# Patient Record
Sex: MALE | Race: WHITE | NOT HISPANIC OR LATINO | Employment: OTHER | URBAN - METROPOLITAN AREA
[De-identification: names, ages, dates, MRNs, and addresses within clinical notes are randomized per-mention and may not be internally consistent; named-entity substitution may affect disease eponyms.]

---

## 2021-07-09 ENCOUNTER — TRANSFERRED RECORDS (OUTPATIENT)
Dept: HEALTH INFORMATION MANAGEMENT | Facility: CLINIC | Age: 76
End: 2021-07-09

## 2021-07-14 ENCOUNTER — TRANSCRIBE ORDERS (OUTPATIENT)
Dept: OTHER | Age: 76
End: 2021-07-14

## 2021-07-14 DIAGNOSIS — H46.9 OPTIC NEUROPATHY: Primary | ICD-10-CM

## 2021-08-24 ENCOUNTER — OFFICE VISIT (OUTPATIENT)
Dept: OPHTHALMOLOGY | Facility: CLINIC | Age: 76
End: 2021-08-24
Attending: OPTOMETRIST
Payer: MEDICARE

## 2021-08-24 DIAGNOSIS — H53.40 VISUAL FIELD DEFECT: ICD-10-CM

## 2021-08-24 DIAGNOSIS — H53.40 VISUAL FIELD DEFECT: Primary | ICD-10-CM

## 2021-08-24 DIAGNOSIS — H46.9 OPTIC NEUROPATHY: ICD-10-CM

## 2021-08-24 PROBLEM — R35.0 BENIGN PROSTATIC HYPERPLASIA WITH URINARY FREQUENCY: Status: ACTIVE | Noted: 2020-06-08

## 2021-08-24 PROBLEM — I42.9 CARDIOMYOPATHY (H): Status: ACTIVE | Noted: 2019-04-16

## 2021-08-24 PROBLEM — I47.19 ATRIAL TACHYCARDIA (H): Status: ACTIVE | Noted: 2019-04-16

## 2021-08-24 PROBLEM — F03.90 DEMENTIA (H): Status: ACTIVE | Noted: 2021-04-23

## 2021-08-24 PROBLEM — I20.0 UNSTABLE ANGINA (H): Status: ACTIVE | Noted: 2019-04-15

## 2021-08-24 PROBLEM — M54.16 LUMBAR RADICULOPATHY: Status: ACTIVE | Noted: 2021-01-06

## 2021-08-24 PROBLEM — R79.89 LOW VITAMIN B12 LEVEL: Status: ACTIVE | Noted: 2021-05-24

## 2021-08-24 PROBLEM — I50.22 CHRONIC SYSTOLIC HEART FAILURE (H): Status: ACTIVE | Noted: 2019-04-16

## 2021-08-24 PROBLEM — I34.0 MITRAL VALVE INSUFFICIENCY: Status: ACTIVE | Noted: 2020-06-08

## 2021-08-24 PROBLEM — N40.1 BENIGN PROSTATIC HYPERPLASIA WITH URINARY FREQUENCY: Status: ACTIVE | Noted: 2020-06-08

## 2021-08-24 PROBLEM — G20.C PARKINSONISM (H): Status: ACTIVE | Noted: 2021-04-23

## 2021-08-24 PROBLEM — I25.10 MILD CORONARY ARTERY DISEASE: Status: ACTIVE | Noted: 2019-02-12

## 2021-08-24 PROBLEM — I48.0 PAROXYSMAL ATRIAL FIBRILLATION (H): Status: ACTIVE | Noted: 2019-04-17

## 2021-08-24 PROCEDURE — 92004 COMPRE OPH EXAM NEW PT 1/>: CPT | Mod: GC | Performed by: OPHTHALMOLOGY

## 2021-08-24 PROCEDURE — G0463 HOSPITAL OUTPT CLINIC VISIT: HCPCS

## 2021-08-24 RX ORDER — ATORVASTATIN CALCIUM 40 MG/1
TABLET, FILM COATED ORAL
COMMUNITY
Start: 2020-11-02

## 2021-08-24 RX ORDER — DONEPEZIL HYDROCHLORIDE 5 MG/1
TABLET, FILM COATED ORAL
COMMUNITY
Start: 2021-04-23 | End: 2022-05-23

## 2021-08-24 RX ORDER — MENTHOL 40 MG/ML
GEL TOPICAL
COMMUNITY

## 2021-08-24 RX ORDER — LOPERAMIDE HCL 2 MG
CAPSULE ORAL
COMMUNITY
Start: 2021-06-14

## 2021-08-24 RX ORDER — METOPROLOL TARTRATE 25 MG/1
TABLET, FILM COATED ORAL
COMMUNITY
Start: 2020-06-15

## 2021-08-24 RX ORDER — TRAMADOL HYDROCHLORIDE 50 MG/1
50-100 TABLET ORAL
COMMUNITY
Start: 2020-06-15

## 2021-08-24 RX ORDER — FUROSEMIDE 20 MG
20 TABLET ORAL
COMMUNITY
Start: 2020-06-15 | End: 2022-07-02

## 2021-08-24 RX ORDER — LOSARTAN POTASSIUM 25 MG/1
TABLET ORAL
COMMUNITY
Start: 2020-06-15

## 2021-08-24 RX ORDER — CARBIDOPA AND LEVODOPA 25; 100 MG/1; MG/1
2 TABLET ORAL
COMMUNITY
Start: 2021-07-26

## 2021-08-24 RX ORDER — TAMSULOSIN HYDROCHLORIDE 0.4 MG/1
CAPSULE ORAL
COMMUNITY
Start: 2020-06-15

## 2021-08-24 RX ORDER — AMOXICILLIN 250 MG
CAPSULE ORAL
COMMUNITY
Start: 2021-03-22

## 2021-08-24 RX ORDER — LEVETIRACETAM 500 MG/1
500 TABLET ORAL
COMMUNITY
Start: 2021-08-02 | End: 2021-09-01

## 2021-08-24 RX ORDER — WARFARIN SODIUM 3 MG/1
TABLET ORAL
COMMUNITY
Start: 2021-05-05

## 2021-08-24 ASSESSMENT — CONF VISUAL FIELD
OS_INFERIOR_NASAL_RESTRICTION: 1
OS_SUPERIOR_TEMPORAL_RESTRICTION: 1
OD_SUPERIOR_NASAL_RESTRICTION: 1
OD_SUPERIOR_TEMPORAL_RESTRICTION: 1
OS_SUPERIOR_NASAL_RESTRICTION: 1
OD_INFERIOR_TEMPORAL_RESTRICTION: 1
OS_INFERIOR_TEMPORAL_RESTRICTION: 1
OD_INFERIOR_NASAL_RESTRICTION: 1

## 2021-08-24 ASSESSMENT — VISUAL ACUITY
OD_SC: 20/600
METHOD: SNELLEN - LINEAR
OS_SC: 20/300

## 2021-08-24 ASSESSMENT — CUP TO DISC RATIO
OD_RATIO: 0
OS_RATIO: 0

## 2021-08-24 ASSESSMENT — SLIT LAMP EXAM - LIDS
COMMENTS: BLEPHARITIS
COMMENTS: BLEPHARITIS

## 2021-08-24 ASSESSMENT — TONOMETRY
OD_IOP_MMHG: 13
OS_IOP_MMHG: 13
IOP_METHOD: ICARE

## 2021-08-24 NOTE — PROGRESS NOTES
Assessment & Plan     Brian Pepper is a 75 year old male with the following diagnoses:   1. Optic neuropathy    2. Visual field defect         Patient was sent for consultation by Dr. Harry Koch for optic neuropathy.      HPI:  The patient is presenting with an acute illness that potentially poses a threat to life or bodily function (vision).    Reports that he has had hazy vision in both eyes since earlier this year. Saw Dr. 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 Patient reports that he sometimes sees circles or zig zag lines in his vision. Possible history of migraines. He has light sensitivity currently and he never had this previously. Reports left temporal headaches that sometimes improve with Tylenol.     Multiple issues since January. Significant issues with balance, cannot sit in a chair without falling over. Sinemet started May or June for Parkinsonism. Doesn't think Sinemet has helped. Has fallen 4-5 times since January.   PCIOL in 2019 or 2020.      Independent historians:    Review of outside testing:  Prior MRI head wwo contrast 1/5/21  FINDINGS:   Mild to moderate generalized cerebral atrophy.  Mild-to-moderate scattered   white matter T2 hyperintensities which, while nonspecific, are likely   microvascular ischemic related.  Negative for acute CVA, acute hemorrhage, mass   or hydrocephalus.  Major flow voids are preserved.  Negative for abnormal   enhancement.  Minimal sinus mucosal thickening without significant sinus or   mastoid fluid.   IMPRESSION:   Mild to moderate atrophy and nonspecific white matter T2 hyperintensities.   Negative for definite superimposed active process      My interpretation performed today of outside testing:  Not available for review    Review of outside clinical notes:  Notes from Dr. Koch    Past medical history:   Vitamin B12 - 248 on 4/23 and 469 on 5/24/21 never had injections an Methylmalonic Acid = 0.21  4/3/21      Medications:   Sinemet, Warfarin, B12, Keppra    Family history / social history:  Reviewed and non-pertinent    Exam:  Visual Acuity 20/600 in the right eye and 20/300 in the left eye. IOP was 13/13. Visual fields were depressed throughout in both eyes. Ocular motility was full in all gaze directions. Color plates were 0/11 in the right eye and 0/11 in the left eye.  Pupils were equal, round and reactive to light with no RAPD. Anterior exam: PCIOL with 2-3+ PCO RIGHT eye and 1-2+ PCO left eye.  Dilated exam with difficult view RIGHT eye due to PCO but possible trace pallor left eye and mild pallor left eye.  He has choroidal nevus macula left eye     Assessment/Plan:   Patient has decreased vision left eye greater than right eye.  There is no clear etiology to this.  He states that his vision has slowly been worsening over the course of approximately several months.  I see in his records that he has had reduced vision but that this typically improves dramatically with pinhole.  I believe that this would suggest that he has a refractive component to his problem.  I do agree that there is mild pallor of both optic nerves and he may have an optic neuropathy.  Unfortunately I am unable to obtain a visual field or an OCT because of his other neurologic problems.  Clearly, he has posterior capsule opacification.  My view into the right eye is reduced.  I think that we could pursue MRI and laboratory work-up, but an MRI would require him to have sedation because of his movement disorder.  The most common etiology here would be some type of refractive issue.  I think the best course of action would be to undergo YAG capsulotomy both eyes with updated refraction after.  If patient's vision is not able to be significantly improved to approximately 20/40 or better with updated glasses would recommend pursuing MRI and lab work up to determine cause.  I will communicate this with Dr. Koch and they will return to  his office to set up these.         Attending Physician Attestation:  Complete documentation of historical and exam elements from today's encounter can be found in the full encounter summary report (not reduplicated in this progress note).  I personally obtained the chief complaint(s) and history of present illness.  I confirmed and edited as necessary the review of systems, past medical/surgical history, family history, social history, and examination findings as documented by others; and I examined the patient myself.  I personally reviewed the relevant tests, images, and reports as documented above.  I formulated and edited as necessary the assessment and plan and discussed the findings and management plan with the patient and family. - Harry Nguyễn, DO   Neuro-ophthalmology Fellow

## 2021-08-24 NOTE — Clinical Note
8/24/2021       RE: Brian Pepper  [address] [facility] [address]     Dear Colleague,    Thank you for referring your patient, Brian Pepper, to the [facility] at [facility]. Please see a copy of my visit note below.         Assessment & Plan     Brian Pepper is a 75 year old male with the following diagnoses:   1. Optic neuropathy    2. Visual field defect         Patient was sent for consultation by Dr. Harry Koch for optic neuropathy.      HPI:  The patient is presenting with an acute illness that potentially poses a threat to life or bodily function (vision).    Reports that he has had hazy vision in both eyes since earlier this year. Saw Dr. 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 Patient reports that he sometimes sees circles or zig zag lines in his vision. Possible history of migraines. He has light sensitivity currently and he never had this previously. Reports left temporal headaches that sometimes improve with Tylenol.     Multiple issues since January. Significant issues with balance, cannot sit in a chair without falling over. Sinemet started May or June for Parkinsonism. Doesn't think Sinemet has helped. Has fallen 4-5 times since January.   PCIOL in 2019 or 2020.      Independent historians:    Review of outside testing:  Prior MRI head wwo contrast 1/5/21  FINDINGS:   Mild to moderate generalized cerebral atrophy.  Mild-to-moderate scattered   white matter T2 hyperintensities which, while nonspecific, are likely   microvascular ischemic related.  Negative for acute CVA, acute hemorrhage, mass   or hydrocephalus.  Major flow voids are preserved.  Negative for abnormal   enhancement.  Minimal sinus mucosal thickening without significant sinus or   mastoid fluid.   IMPRESSION:   Mild to moderate atrophy and nonspecific white matter T2 hyperintensities.   Negative for definite superimposed active  process      My interpretation performed today of outside testing:  Not available for review    Review of outside clinical notes:  Notes from Dr. Koch    Past medical history:   Vitamin B12 - 248 on 4/23 and 469 on 5/24/21 never had injections an Methylmalonic Acid = 0.21 4/3/21      Medications:   Sinemet, Warfarin, B12, Keppra    Family history / social history:  Reviewed and non-pertinent    Exam:  Visual Acuity 20/600 in the right eye and 20/300 in the left eye. IOP was 13/13. Visual fields were depressed throughout in both eyes. Ocular motility was full in all gaze directions. Color plates were 0/11 in the right eye and 0/11 in the left eye.  Pupils were equal, round and reactive to light with no RAPD. Anterior exam: PCIOL with 2-3+ PCO RIGHT eye and 1-2+ PCO left eye.  Dilated exam with difficult view RIGHT eye due to PCO but possible trace pallor left eye and mild pallor left eye.  He has choroidal nevus macula left eye     Assessment/Plan:   Patient has decreased vision left eye greater than right eye.  There is no clear etiology to this.  He states that his vision has slowly been worsening over the course of approximately several months.  I see in his records that he has had reduced vision but that this typically improves dramatically with pinhole.  I believe that this would suggest that he has a refractive component to his problem.  I do agree that there is mild pallor of both optic nerves and he may have an optic neuropathy.  Unfortunately I am unable to obtain a visual field or an OCT because of his other neurologic problems.  Clearly, he has posterior capsule opacification.  My view into the right eye is reduced.  I think that we could pursue MRI and laboratory work-up, but an MRI would require him to have sedation because of his movement disorder.  The most common etiology here would be some type of refractive issue.  I think the best course of action would be to undergo YAG capsulotomy both eyes with  updated refraction after.  If patient's vision is not able to be significantly improved to approximately 20/40 or better with updated glasses would recommend pursuing MRI and lab work up to determine cause.  I will communicate this with Dr. Koch and they will return to his office to set up these.         Attending Physician Attestation:  Complete documentation of historical and exam elements from today's encounter can be found in the full encounter summary report (not reduplicated in this progress note).  I personally obtained the chief complaint(s) and history of present illness.  I confirmed and edited as necessary the review of systems, past medical/surgical history, family history, social history, and examination findings as documented by others; and I examined the patient myself.  I personally reviewed the relevant tests, images, and reports as documented above.  I formulated and edited as necessary the assessment and plan and discussed the findings and management plan with the patient and family. - Harry Nguyễn, DO   Neuro-ophthalmology Fellow           Again, thank you for allowing me to participate in the care of your patient.      Sincerely,    Harry Reardon MD

## 2021-08-24 NOTE — NURSING NOTE
Chief Complaints and History of Present Illnesses   Patient presents with     Blurred Vision Evaluation     Chief Complaint(s) and History of Present Illness(es)     Blurred Vision Evaluation               Comments     Brian Pepper is a 75 year old male who presents today for    Optic atrophy, left eye.   Patient is asymptomatic.     [facility] 1:08 PM August 24, 2021       [facility] 1:08 PM August 24, 2021

## 2021-08-24 NOTE — LETTER
2021         RE:  :  MRN: Brian Pepper  1945  [id number]     Dear Dr. Koch,    Thank you for asking me to see your very pleasant patient, Brian Pepper, in neuro-ophthalmic consultation.  I would like to thank you for sending your records and I have summarized them in the history of present illness.  My assessment and plan are below.  For further details, please see my attached clinic note.      Assessment & Plan     Brian Pepper is a 75 year old male with the following diagnoses:   1. Optic neuropathy    2. Visual field defect         Patient was sent for consultation by Dr. Harry Koch for optic neuropathy.      HPI:  The patient is presenting with an acute illness that potentially poses a threat to life or bodily function (vision).    Reports that he has had hazy vision in both eyes since earlier this year. Saw Dr. 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 Patient reports that he sometimes sees circles or zig zag lines in his vision. Possible history of migraines. He has light sensitivity currently and he never had this previously. Reports left temporal headaches that sometimes improve with Tylenol.     Multiple issues since January. Significant issues with balance, cannot sit in a chair without falling over. Sinemet started May or  for Parkinsonism. Doesn't think Sinemet has helped. Has fallen 4-5 times since January.   PCIOL in 2019 or .      Independent historians:    Review of outside testing:  Prior MRI head wwo contrast 21  FINDINGS:   Mild to moderate generalized cerebral atrophy.  Mild-to-moderate scattered   white matter T2 hyperintensities which, while nonspecific, are likely   microvascular ischemic related.  Negative for acute CVA, acute hemorrhage, mass   or hydrocephalus.  Major flow voids are preserved.  Negative for abnormal   enhancement.  Minimal sinus mucosal thickening without significant sinus or   mastoid fluid.    IMPRESSION:   Mild to moderate atrophy and nonspecific white matter T2 hyperintensities.   Negative for definite superimposed active process    My interpretation performed today of outside testing:  Not available for review    Review of outside clinical notes:  Notes from Dr. Koch    Past medical history:   Vitamin B12 - 248 on 4/23 and 469 on 5/24/21 never had injections an Methylmalonic Acid = 0.21 4/3/21    Medications:   Sinemet, Warfarin, B12, Keppra    Family history / social history:  Reviewed and non-pertinent    Exam:  Visual Acuity 20/600 in the right eye and 20/300 in the left eye. IOP was 13/13. Visual fields were depressed throughout in both eyes. Ocular motility was full in all gaze directions. Color plates were 0/11 in the right eye and 0/11 in the left eye.  Pupils were equal, round and reactive to light with no RAPD. Anterior exam: PCIOL with 2-3+ PCO RIGHT eye and 1-2+ PCO left eye.  Dilated exam with difficult view RIGHT eye due to PCO but possible trace pallor left eye and mild pallor left eye.  He has choroidal nevus macula left eye     Assessment/Plan:   Patient has decreased vision left eye greater than right eye.  There is no clear etiology to this.  He states that his vision has slowly been worsening over the course of approximately several months.  I see in his records that he has had reduced vision but that this typically improves dramatically with pinhole.  I believe that this would suggest that he has a refractive component to his problem.  I do agree that there is mild pallor of both optic nerves and he may have an optic neuropathy.  Unfortunately I am unable to obtain a visual field or an OCT because of his other neurologic problems.  Clearly, he has posterior capsule opacification.  My view into the right eye is reduced.  I think that we could pursue MRI and laboratory work-up, but an MRI would require him to have sedation because of his movement disorder.  The most common etiology  here would be some type of refractive issue.  I think the best course of action would be to undergo YAG capsulotomy both eyes with updated refraction after.  If patient's vision is not able to be significantly improved to approximately 20/40 or better with updated glasses would recommend pursuing MRI and lab work up to determine cause.  I will communicate this with Dr. Koch and they will return to his office to set up these.        Again, thank you for allowing me to participate in the care of your patient.      Sincerely,    Harry Reardon MD  Professor  Ophthalmology Residency   Director of Neuro-Ophthalmology  [place] [facility] Chair  Departments of Ophthalmology, Neurology, and Neurosurgery  [facility] [address] [place] [address]  [id number]  T - [phone]  F - [phone]  [email or web address]      CC: Harry Koch, [facility] [place]  [address] [place] [address]  Via Fax: [phone]